# Patient Record
Sex: MALE | Race: BLACK OR AFRICAN AMERICAN
[De-identification: names, ages, dates, MRNs, and addresses within clinical notes are randomized per-mention and may not be internally consistent; named-entity substitution may affect disease eponyms.]

---

## 2020-01-29 ENCOUNTER — APPOINTMENT (OUTPATIENT)
Dept: UROLOGY | Facility: CLINIC | Age: 40
End: 2020-01-29
Payer: COMMERCIAL

## 2020-01-29 VITALS
SYSTOLIC BLOOD PRESSURE: 127 MMHG | DIASTOLIC BLOOD PRESSURE: 78 MMHG | WEIGHT: 180 LBS | HEART RATE: 87 BPM | BODY MASS INDEX: 25.77 KG/M2 | HEIGHT: 70 IN

## 2020-01-29 DIAGNOSIS — Z82.0 FAMILY HISTORY OF EPILEPSY AND OTHER DISEASES OF THE NERVOUS SYSTEM: ICD-10-CM

## 2020-01-29 DIAGNOSIS — Z78.9 OTHER SPECIFIED HEALTH STATUS: ICD-10-CM

## 2020-01-29 DIAGNOSIS — Z82.49 FAMILY HISTORY OF ISCHEMIC HEART DISEASE AND OTHER DISEASES OF THE CIRCULATORY SYSTEM: ICD-10-CM

## 2020-01-29 DIAGNOSIS — Z83.2 FAMILY HISTORY OF DISEASES OF THE BLOOD AND BLOOD-FORMING ORGANS AND CERTAIN DISORDERS INVOLVING THE IMMUNE MECHANISM: ICD-10-CM

## 2020-01-29 DIAGNOSIS — Z80.42 FAMILY HISTORY OF MALIGNANT NEOPLASM OF PROSTATE: ICD-10-CM

## 2020-01-29 PROBLEM — Z00.00 ENCOUNTER FOR PREVENTIVE HEALTH EXAMINATION: Status: ACTIVE | Noted: 2020-01-29

## 2020-01-29 PROCEDURE — 99204 OFFICE O/P NEW MOD 45 MIN: CPT

## 2020-01-29 NOTE — LETTER HEADER
[FreeTextEntry3] : Miladys Lutz M.D.\par Director of Urology\par Director of Male Infertility\par [facility]\par [address] [place] [address], [facility] [address]\[place] [address]\par

## 2020-01-29 NOTE — HISTORY OF PRESENT ILLNESS
[Currently Experiencing ___] :  [unfilled] [FreeTextEntry1] : Jl is a 39-year-old -American male who has been with capital Edasia who is a 31-year-old -American female in a monogamous relationship for three years. They [place] see each other anywhere from 1 to 2 weeks. For the first six months of their relationship to use condoms for the last 2 ½ years with sex has been without any form of contraception be it barrier or medical. Including IV checks as well as an evaluation by Dr. Parra and she make sure that they have relations at those times. She said three cycles of Clomid 50 mg dose reportedly is ovulating well with trans vaginal ultrasounds documenting number of follicles but she does not know how many she was making.  They finally did a semen analysis that Dr. Pedraza and it was suboptimal but they don’t know the exact parameters. From what she understands the count was low the grade of motility was poor he thinks the shape was okay she says it was in. They did it twice with similar results each time. He has no prior history of urologic exam has no trouble with relations no history of prostatitis urethritis etc. and does have sickle cell trait but that’s only by blood test he’s never had a sickle-cell attack. There is no prior history of surgery allergies to medication and in general he is in good health. Is no history of injuries accidents or radiation treatment he works as a  without exposure to toxic chemicals. They have a good relationship he has not seen a therapist doesn’t smoke will have alcohol perhaps on the weekends and does not use recreational drugs. They do not need to use any exogenous lubrication just for playing and her evaluation by Dr. Pedraza shows the cycle of seven days out of 30 with mild cramping without the need for medications. He is an only child she has a brother who is pinto and is not been trying to have children. She had a former relationship about 10 years ago that [facility] and she got pregnant resulting in electric termination reportedly there were no side effects. Termination was in the third month. He has not had other partners without birth control.\par \par (family history is positive for a grandfather with a prostate cancer, a father with hypertension and a mother with sickle cell trait.)\par

## 2020-01-29 NOTE — LETTER BODY
[Dear  ___] : Dear  [unfilled], [Consult Letter:] : I had the pleasure of evaluating your patient, [unfilled]. [Consult Closing:] : Thank you very much for allowing me to participate in the care of this patient.  If you have any questions, please do not hesitate to contact me. [Please see my note below.] : Please see my note below. [Sincerely,] : Sincerely, [FreeTextEntry2] : Genaro Pedraza MD\par [address] \[place] [address]\par

## 2020-01-29 NOTE — ASSESSMENT
[FreeTextEntry1] : His physical exam shows him to be in decent shape with their spirits. The cardiopulmonary exam was benign. The phalluses on the larger side without any evidence of plaques with discharge. The testicles are on the smaller side at about 14 mL he is using the Prader orchidometer the court felt a little thickened but I did not feel an impulse and the Doppler stethoscope exam was negative. Rectal exam showed a small prostate that felt normal \par \par I need more data. Will try and get the blood tests as well as a semen analysis and then see if we need to go for genetic testing or repeat the hormone essay in case it did and get everything I need. I will send it for a scrotal ultrasound to see if I missed anything and they will contact us once we have the results from Dr. Pedraza to see if we need to do any repeats.

## 2020-01-29 NOTE — ADDENDUM
[FreeTextEntry1] : They will speak to Dr. Pedraza, as given his sickle cell trait is IVF with PGD recommended anyway and if so the amount of sperm he would need is less than the amount of treatments we would offer of any that are applicable would be less. However that is something that will speak with Dr. Fuller especially as though he has trait he doesn’t know which one and he’s never had an attack nor has his mother also has trait and his partner has been tested and was negative.

## 2020-01-29 NOTE — PHYSICAL EXAM
[General Appearance - Well Developed] : well developed [Normal Appearance] : normal appearance [General Appearance - Well Nourished] : well nourished [General Appearance - In No Acute Distress] : no acute distress [Well Groomed] : well groomed [Respiration, Rhythm And Depth] : normal respiratory rhythm and effort [Heart Rate And Rhythm] : Heart rate and rhythm were normal [Edema] : no peripheral edema [Auscultation Breath Sounds / Voice Sounds] : lungs were clear to auscultation bilaterally [Abdomen Soft] : soft [Exaggerated Use Of Accessory Muscles For Inspiration] : no accessory muscle use [Abdomen Hernia] : no hernia was discovered [Abdomen Tenderness] : non-tender [Costovertebral Angle Tenderness] : no ~M costovertebral angle tenderness [Urethral Meatus] : meatus normal [Penis Abnormality] : normal circumcised penis [Urinary Bladder Findings] : the bladder was normal on palpation [Scrotum] : the scrotum was normal [Epididymis] : the epididymides were normal [Rectal Exam - Rectum] : digital rectal exam was normal [Testes Mass (___cm)] : there were no testicular masses [Testes Tenderness] : no tenderness of the testes [Prostate Tenderness] : the prostate was not tender [Prostate Enlargement] : the prostate was not enlarged [No Prostate Nodules] : no prostate nodules [Normal Station and Gait] : the gait and station were normal for the patient's age [] : no rash [No Focal Deficits] : no focal deficits [Oriented To Time, Place, And Person] : oriented to person, place, and time [Affect] : the affect was normal [Mood] : the mood was normal [Not Anxious] : not anxious [Inguinal Lymph Nodes Enlarged Bilaterally] : inguinal [FreeTextEntry1] : DTR's & BC reflexes were intact

## 2020-01-30 ENCOUNTER — FORM ENCOUNTER (OUTPATIENT)
Age: 40
End: 2020-01-30

## 2020-01-31 ENCOUNTER — OUTPATIENT (OUTPATIENT)
Dept: OUTPATIENT SERVICES | Facility: HOSPITAL | Age: 40
LOS: 1 days | Discharge: HOME | End: 2020-01-31
Payer: COMMERCIAL

## 2020-01-31 DIAGNOSIS — N46.9 MALE INFERTILITY, UNSPECIFIED: ICD-10-CM

## 2020-01-31 PROCEDURE — 76870 US EXAM SCROTUM: CPT | Mod: 26

## 2020-02-06 ENCOUNTER — APPOINTMENT (OUTPATIENT)
Dept: UROLOGY | Facility: CLINIC | Age: 40
End: 2020-02-06
Payer: COMMERCIAL

## 2020-02-06 VITALS
SYSTOLIC BLOOD PRESSURE: 136 MMHG | HEART RATE: 65 BPM | BODY MASS INDEX: 25.77 KG/M2 | WEIGHT: 180 LBS | HEIGHT: 70 IN | DIASTOLIC BLOOD PRESSURE: 90 MMHG

## 2020-02-06 DIAGNOSIS — N46.9 MALE INFERTILITY, UNSPECIFIED: ICD-10-CM

## 2020-02-06 PROCEDURE — 99214 OFFICE O/P EST MOD 30 MIN: CPT

## 2020-02-06 RX ORDER — CLOMIPHENE CITRATE 50 MG/1
50 TABLET ORAL
Qty: 15 | Refills: 1 | Status: ACTIVE | OUTPATIENT
Start: 2020-02-06

## 2020-02-06 NOTE — LETTER BODY
[Dear  ___] : Dear  [unfilled], [Please see my note below.] : Please see my note below. [Courtesy Letter:] : I had the pleasure of seeing your patient, [unfilled], in my office today. [Sincerely,] : Sincerely, [FreeTextEntry2] : Genaro Pedraza MD\par [address] \[place] [address]\par

## 2020-02-06 NOTE — PHYSICAL EXAM
[General Appearance - Well Developed] : well developed [Normal Appearance] : normal appearance [General Appearance - Well Nourished] : well nourished [General Appearance - In No Acute Distress] : no acute distress [Well Groomed] : well groomed [Respiration, Rhythm And Depth] : normal respiratory rhythm and effort [] : no respiratory distress [Oriented To Time, Place, And Person] : oriented to person, place, and time [Exaggerated Use Of Accessory Muscles For Inspiration] : no accessory muscle use [Affect] : the affect was normal [Mood] : the mood was normal [Not Anxious] : not anxious [Normal Station and Gait] : the gait and station were normal for the patient's age

## 2020-02-06 NOTE — ASSESSMENT
[FreeTextEntry1] : The question then becomes one can they do preimplantation genetic diagnosis for sickle cell trait and if so IVF might be a good idea anyway and only put in the embryos without the sickle cell trait. Number two if they cannot and she wants a baby now he can go to IVF.\par \par If she really doesn’t want to do IVF was she has to we can use Clomid in an off off label use to try and hyper drive the FSH and LH getting it to a point that he’s maximally stimulated without the hormone levels going to high or getting side effects. They have been explained that this is really off label because his parameters are within normal the testosterone is low normal and we can probably push it.\par \par I have no trouble starting the Clomid now though I would want a repeat semen analysis at the end of February early March which would be three months from the first one.\par

## 2020-02-06 NOTE — HISTORY OF PRESENT ILLNESS
[FreeTextEntry1] : Jl and Phyllis came back for follow-up. There was a question of suboptimal sperm and the question is there anything we can do to manipulate the system to increase the chance of pregnancy. We tried to get the sperm results from Dr. Pedraza sent him for hormone levels and a scrotal ultrasound and is here for review.

## 2020-03-16 ENCOUNTER — APPOINTMENT (OUTPATIENT)
Dept: UROLOGY | Facility: CLINIC | Age: 40
End: 2020-03-16

## 2023-02-15 ENCOUNTER — NON-APPOINTMENT (OUTPATIENT)
Age: 43
End: 2023-02-15

## 2023-06-12 ENCOUNTER — NON-APPOINTMENT (OUTPATIENT)
Age: 43
End: 2023-06-12